# Patient Record
Sex: MALE | Race: WHITE
[De-identification: names, ages, dates, MRNs, and addresses within clinical notes are randomized per-mention and may not be internally consistent; named-entity substitution may affect disease eponyms.]

---

## 2019-03-09 ENCOUNTER — HOSPITAL ENCOUNTER (EMERGENCY)
Dept: HOSPITAL 56 - MW.ED | Age: 49
Discharge: HOME | End: 2019-03-09
Payer: COMMERCIAL

## 2019-03-09 DIAGNOSIS — Z23: ICD-10-CM

## 2019-03-09 DIAGNOSIS — S61.210A: Primary | ICD-10-CM
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
## 2019-03-09 PROCEDURE — 90471 IMMUNIZATION ADMIN: CPT

## 2019-03-09 PROCEDURE — 90715 TDAP VACCINE 7 YRS/> IM: CPT

## 2019-03-09 PROCEDURE — 99282 EMERGENCY DEPT VISIT SF MDM: CPT

## 2019-03-09 PROCEDURE — 12001 RPR S/N/AX/GEN/TRNK 2.5CM/<: CPT

## 2019-03-09 NOTE — EDM.PDOC
ED HPI GENERAL MEDICAL PROBLEM





- General


Chief Complaint: Laceration


Stated Complaint: CUT ON RT INDEX FINGER


Time Seen by Provider: 03/09/19 21:07


Source of Information: Reports: Patient


History Limitations: Reports: No Limitations





- History of Present Illness


INITIAL COMMENTS - FREE TEXT/NARRATIVE: 


HISTORY AND PHYSICAL:





History of present illness:


Patient is a 48-year-old male who presents to the emergency room with concerns 

of a laceration to the right index finger. Approximately 2 cm along the lateral 

aspect of the middle knuckle. Appears to have no tendon involvement. He denies 

any crush injury or trauma associated with this. He denies any numbness or 

tingling to the affected extremity.





Review of systems: 


As per history of present illness and below otherwise all systems reviewed and 

negative.





Past medical history: 


As per history of present illness and as reviewed below otherwise 

noncontributory.





Surgical history: 


As per history of present illness and as reviewed below otherwise 

noncontributory.





Social history: 


See social history for further information





Family history: 


As per history of present illness and as reviewed below otherwise 

noncontributory.





Physical exam:


General: Well-developed and well-nourished 48-year-old male. Alert and 

oriented. Nontoxic appearing and in no acute distress.


HEENT: Atraumatic, normocephalic, pupils equal and reactive bilaterally, 

negative for conjunctival pallor or scleral icterus, mucous membranes moist, 

TMs normal bilaterally, throat clear, neck supple, nontender, trachea midline. 

No drooling or trismus noted. No meningeal signs. No hot potato voice noted. 


Lungs: Clear to auscultation, breath sounds equal bilaterally, chest nontender.


Heart: S1S2, regular rate and rhythm without overt murmur


Abdomen: Soft, nondistended, nontender. 


Pelvis: Stable nontender.


Genitourinary: Deferred.


Rectal: Deferred.


Skin: See Extremities. Otherwise skin is intact, warm, dry. No lesions or 

rashes noted.


Extremities: 2 cm laceration along the lateral aspect of the right index finger 

at the MIP joint. Wrist have no tendon involvement. Good strength and capillary 

refill. Neurovascular unremarkable.


Neuro: Awake, alert, oriented. Cranial nerves II through XII unremarkable. 

Cerebellum unremarkable. Motor and sensory unremarkable throughout. Exam 

nonfocal.





Notes:


Appears to have no tendon involvement. 1% lidocaine was used to anesthetize the 

area. Laceration was irrigated and wound wash/chlorhexidine was used. Usual and 

customary procedures for suture placement. 5-0 nylon was used to close the area

, #5 interrupted sutures. Aluminum splint was used with education.


Supportive care measures were reviewed and discussed. Voices understanding and 

is agreeable to plan of care. Denies any further questions or concerns at this 

time.





Diagnostics:


None





Therapeutics:


1% lidocaine, Tdap, bacitracin ointment and wound care





Prescription:


None





Impression: 


Finger laceration





Plan:


1. Take the area clean and dry. Continue to monitor for signs of infection. 

Suture to be removed in 7-10 days.


2. Tylenol and/or ibuprofen as needed for pain management.


3. Follow-up with your primary care provider as needed. Return to the ED as 

needed and as discussed.





Definitive disposition and diagnosis as appropriate pending reevaluation and 

review of above.





  ** right middle finger


Pain Score (Numeric/FACES): 2





- Related Data


 Allergies











Allergy/AdvReac Type Severity Reaction Status Date / Time


 


No Known Allergies Allergy   Verified 03/09/19 21:12











Home Meds: 


 Home Meds





amLODIPine Besylate [Amlodipine Besylate] 1 tab PO DAILY 03/09/19 [History]











ED ROS GENERAL





- Review of Systems


Review Of Systems: ROS reveals no pertinent complaints other than HPI.





ED EXAM, SKIN/RASH


Exam: See Below (See dictation)





ED SKIN PROCEDURES





- Laceration/Wound Repair


  ** right index finger


Lac/Wound length In cm: 2


Appearance: Subcutaneous, Linear


Distal NVT: Neuro & Vascular Intact, No Tendon Injury


Local Anesthesia - Lidocaine (Xylocaine): 1% Plain


Local Anesthetic Volume: 3cc


Skin Prep: Chlorhexidine (Hibiciens)


Saline Irrigation (cc's): 30


Exploration/Debridement/Repair: Wound Explored, No Foreign Material Found


Closed with: Sutures


Suture Size: other (5-0)


# of Sutures: 5


Suture Type: Nylon, Interrupted, Simple


Drain Placement: No


Sterile Dressing Applied: Provider


Tetanus Status Addressed: Yes


Complications: No





Course





- Vital Signs


Last Recorded V/S: 


 Last Vital Signs











Temp  97.5 F   03/09/19 21:13


 


Pulse  74   03/09/19 21:13


 


Resp  18   03/09/19 21:13


 


BP  136/92 H  03/09/19 21:13


 


Pulse Ox  98   03/09/19 21:13














- Orders/Labs/Meds


Orders: 


 Active Orders 24 hr











 Category Date Time Status


 


 Vaccines to be Administered [RC] PER UNIT ROUTINE Care  03/09/19 21:11 Ordered











Meds: 


Medications














Discontinued Medications














Generic Name Dose Route Start Last Admin





  Trade Name Janet  PRN Reason Stop Dose Admin


 


Bacitracin  1 dose  03/09/19 21:11  03/09/19 21:19





  Bacitracin Oint 1 Gm  TOP  03/09/19 21:12  1 dose





  ONETIME ONE   Administration





     





     





     





     


 


Diphtheria/Tetanus/Acell Pertussis  0.5 ml  03/09/19 21:10  03/09/19 21:20





  Adacel  IM  03/09/19 21:11  0.5 ml





  .ONCE ONE   Administration





     





     





     





     


 


Lidocaine HCl  5 ml  03/09/19 21:10  03/09/19 21:19





  Xylocaine-Mpf 1%  INJECT  03/09/19 21:11  5 ml





  ONETIME ONE   Administration





     





     





     





     














Departure





- Departure


Time of Disposition: 21:40


Disposition: Home, Self-Care 01


Clinical Impression: 


Finger laceration


Qualifiers:


 Encounter type: initial encounter Finger: index finger Damage to nail status: 

without damage Foreign body presence: without foreign body Laterality: right 

Qualified Code(s): S61.210A - Laceration without foreign body of right index 

finger without damage to nail, initial encounter








- Discharge Information


Instructions:  Laceration Care, Adult, Easy-to-Read


Referrals: 


PCP,None [Primary Care Provider] - 


Forms:  ED Department Discharge


Additional Instructions: 


The following information is given to patients seen in the emergency department 

who are being discharged to home. This information is to outline your options 

for follow-up care. We provide all patients seen in our emergency department 

with a follow-up referral.





The need for follow-up, as well as the timing and circumstances, are variable 

depending upon the specifics of your emergency department visit.





If you don't have a primary care physician on staff, we will provide you with a 

referral. We always advise you to contact your personal physician following an 

emergency department visit to inform them of the circumstance of the visit and 

for follow-up with them and/or the need for any referrals to a consulting 

specialist.





The emergency department will also refer you to a specialist when appropriate. 

This referral assures that you have the opportunity for follow-up care with a 

specialist. All of these measure are taken in an effort to provide you with 

optimal care, which includes your follow-up.





Under all circumstances we always encourage you to contact your private 

physician who remains a resource for coordinating your care. When calling for 

follow-up care, please make the office aware that this follow-up is from your 

recent emergency room visit. If for any reason you are refused follow-up, 

please contact the [facility] Emergency 

Department at [phone] and asked to speak to the emergency department 

charge nurse.





[facility]


Primary Care


[address] [place] [id number]


Phone: [phone]


Fax: [phone]





[facility]


[address] [place] [address]


Phone: [phone]


Fax: [phone]





1. Keep the area clean and dry. He may wash her hands gently twice daily with 

mild soap and water. Do not submerge the hand/finger in water for long periods 

of time. Continue to monitor for signs of infection. Suture to be removed in 7-

10 days.


2. Tylenol and/or ibuprofen as needed for pain management.


3. Follow-up with your primary care provider as needed. Return to the ED as 

needed and as discussed.





- My Orders


Last 24 Hours: 


My Active Orders





03/09/19 21:11


Vaccines to be Administered [RC] PER UNIT ROUTINE 














- Assessment/Plan


Last 24 Hours: 


My Active Orders





03/09/19 21:11


Vaccines to be Administered [RC] PER UNIT ROUTINE

## 2021-10-15 ENCOUNTER — HOSPITAL ENCOUNTER (EMERGENCY)
Dept: HOSPITAL 41 - JD.ED | Age: 51
Discharge: HOME | End: 2021-10-15
Payer: COMMERCIAL

## 2021-10-15 DIAGNOSIS — Z79.899: ICD-10-CM

## 2021-10-15 DIAGNOSIS — I10: ICD-10-CM

## 2021-10-15 DIAGNOSIS — U07.1: Primary | ICD-10-CM

## 2021-10-15 DIAGNOSIS — E78.00: ICD-10-CM

## 2021-10-15 NOTE — EDM.PDOC
ED HPI GENERAL MEDICAL PROBLEM





- General


Chief Complaint: Fever


Stated Complaint: COVID +\FEVER


Time Seen by Provider: 10/15/21 15:00





- History of Present Illness


INITIAL COMMENTS - FREE TEXT/NARRATIVE: 





50-year-old male presents the emergency room with a high fever earlier today and

he is known Covid positive.





The patient has been mildly symptomatic for a little over a week now.  And he 

thought he was getting better however he had a temperature approaching 106 

degrees today.  He feels much better now he did take some Tylenol and Motrin.  

He has generalized achiness and a cough he does not have chest pain.  He did 

have partial loss of taste and smell.  He is treated for hypertension 

hyperlipidemia but no history of diabetes heart problems or other medical 

issues.





- Related Data


                                    Allergies











Allergy/AdvReac Type Severity Reaction Status Date / Time


 


No Known Allergies Allergy   Verified 10/15/21 13:57











Home Meds: 


                                    Home Meds





amLODIPine Besylate [Amlodipine Besylate] 10 mg PO DAILY 03/09/19 [History]


Cholecalciferol (Vitamin D3) [Vitamin D] 1,000 mg PO DAILY 10/15/21 [History]


Ubidecarenone [Coq-10] 1 tab PO DAILY 10/15/21 [History]


atorvaSTATin [Lipitor] 40 mg PO DAILY 10/15/21 [History]











Past Medical History


HEENT History: Reports: None


Cardiovascular History: Reports: High Cholesterol, Hypertension


Respiratory History: Reports: None


Gastrointestinal History: Reports: None


Genitourinary History: Reports: None


Musculoskeletal History: Reports: None


Neurological History: Reports: None


Psychiatric History: Reports: None


Endocrine/Metabolic History: Reports: None


Hematologic History: Reports: None


Immunologic History: Reports: None


Oncologic (Cancer) History: Reports: None


Dermatologic History: Reports: None





- Infectious Disease History


Infectious Disease History: Reports: None





- Past Surgical History


HEENT Surgical History: Reports: Tonsillectomy


GI Surgical History: Reports: Appendectomy


Musculoskeletal Surgical History: Reports: Other (See Below)


Other Musculoskeletal Surgeries/Procedures:: hand surgery





Social & Family History





- Family History


Family Medical History: No Pertinent Family History





- Tobacco Use


Tobacco Use Status *Q: Never Tobacco User





- Caffeine Use


Caffeine Use: Reports: Coffee, Soda





- Alcohol Use


Days Per Week of Alcohol Use: 4


Number of Drinks Per Day: 3


Total Drinks Per Week: 12





- Recreational Drug Use


Recreational Drug Use: No





ED ROS GENERAL





- Review of Systems


Review Of Systems: See Below


Constitutional: Reports: No Symptoms, Fever, Malaise, Fatigue.  Denies: Chills


HEENT: Reports: Other (Vague congestive symptoms)


Respiratory: Reports: Cough.  Denies: Shortness of Breath, Wheezing


Cardiovascular: Reports: No Symptoms


Endocrine: Reports: No Symptoms


GI/Abdominal: Reports: No Symptoms


: Reports: No Symptoms


Musculoskeletal: Reports: No Symptoms


Skin: Reports: No Symptoms


Neurological: Reports: No Symptoms





ED EXAM, GENERAL





- Physical Exam


Exam: See Below


Exam Limited By: No Limitations


General Appearance: Alert, No Apparent Distress


Eye Exam: Bilateral Eye: Normal Inspection, PERRL


Ears: Normal External Exam, Normal Canal, Hearing Grossly Normal, Normal TMs


Nose: Normal Inspection, Normal Mucosa, No Blood


Throat/Mouth: Normal Inspection, Normal Lips, Normal Teeth, Normal Gums, Normal 

Oropharynx, Normal Voice, No Airway Compromise


Head: Atraumatic, Normocephalic


Neck: Normal Inspection, Supple, Non-Tender, Full Range of Motion.  No: 

Lymphadenopathy (L), Lymphadenopathy (R)


Respiratory/Chest: No Respiratory Distress, Lungs Clear, Normal Breath Sounds


Cardiovascular: Regular Rate, Rhythm, No Edema, No Murmur


GI/Abdominal: Normal Bowel Sounds, Soft, Non-Tender


Back Exam: Normal Inspection, Full Range of Motion.  No: CVA Tenderness (L), CVA

 Tenderness (R)


Extremities: Normal Inspection, Normal Range of Motion


Neurological: Alert, Oriented


Psychiatric: Normal Affect, Normal Mood


Skin Exam: Warm, Dry, Intact





Course





- Vital Signs


Last Recorded V/S: 


                                Last Vital Signs











Temp  37.0 C   10/15/21 13:51


 


Pulse  81   10/15/21 13:51


 


Resp  16   10/15/21 13:51


 


BP  126/91 H  10/15/21 13:51


 


Pulse Ox  95   10/15/21 13:51














- Orders/Labs/Meds


Labs: 


                                Laboratory Tests











  10/15/21 10/15/21 10/15/21 Range/Units





  16:30 16:30 16:30 


 


WBC  3.33 L    (4.23-9.07)  K/mm3


 


RBC  5.54    (4.63-6.08)  M/mm3


 


Hgb  16.4    (13.7-17.5)  gm/dl


 


Hct  46.7    (40.1-51.0)  %


 


MCV  84.3    (79.0-92.2)  fl


 


MCH  29.6    (25.7-32.2)  pg


 


MCHC  35.1    (32.2-35.5)  g/dl


 


RDW Std Deviation  36.6    (35.1-43.9)  fL


 


Plt Count  124 L    (163-337)  K/mm3


 


MPV  10.3    (9.4-12.3)  fl


 


Neut % (Auto)  52.9    (34.0-67.9)  %


 


Lymph % (Auto)  33.3    (21.8-53.1)  %


 


Mono % (Auto)  12.9 H    (5.3-12.2)  %


 


Eos % (Auto)  0.3 L    (0.8-7.0)  


 


Baso % (Auto)  0.3    (0.1-1.2)  %


 


Neut # (Auto)  1.76 L    (1.78-5.38)  K/mm3


 


Lymph # (Auto)  1.11 L    (1.32-3.57)  K/mm3


 


Mono # (Auto)  0.43    (0.30-0.82)  K/mm3


 


Eos # (Auto)  0.01 L    (0.04-0.54)  K/mm3


 


Baso # (Auto)  0.01    (0.01-0.08)  K/mm3


 


D-Dimer, Quantitative   0.83 H   (0.19-0.50)  mg/L


 


Sodium    138  (136-145)  mEq/L


 


Potassium    3.9  (3.5-5.1)  mEq/L


 


Chloride    102  ()  mEq/L


 


Carbon Dioxide    25  (21-32)  mEq/L


 


Anion Gap    14.9  (5-15)  


 


BUN    10  (7-18)  mg/dL


 


Creatinine    1.0  (0.7-1.3)  mg/dL


 


Est Cr Clr Drug Dosing    91.25  mL/min


 


Estimated GFR (MDRD)    > 60  (>60)  mL/min


 


BUN/Creatinine Ratio    10.0 L  (14-18)  


 


Glucose    94  (70-99)  mg/dL


 


Calcium    8.7  (8.5-10.1)  mg/dL


 


Ferritin     ()  ng/ml


 


Total Bilirubin    0.9  (0.2-1.0)  mg/dL


 


AST    23  (15-37)  U/L


 


ALT    33  (16-63)  U/L


 


Alkaline Phosphatase    69  ()  U/L


 


Lactate Dehydrogenase    328 H  ()  U/L


 


C-Reactive Protein    2.6 H*  (<1.0)  mg/dL


 


Total Protein    7.5  (6.4-8.2)  g/dl


 


Albumin    3.7  (3.4-5.0)  g/dl


 


Globulin    3.8  gm/dL


 


Albumin/Globulin Ratio    1.0  (1-2)  














  10/15/21 Range/Units





  16:30 


 


WBC   (4.23-9.07)  K/mm3


 


RBC   (4.63-6.08)  M/mm3


 


Hgb   (13.7-17.5)  gm/dl


 


Hct   (40.1-51.0)  %


 


MCV   (79.0-92.2)  fl


 


MCH   (25.7-32.2)  pg


 


MCHC   (32.2-35.5)  g/dl


 


RDW Std Deviation   (35.1-43.9)  fL


 


Plt Count   (163-337)  K/mm3


 


MPV   (9.4-12.3)  fl


 


Neut % (Auto)   (34.0-67.9)  %


 


Lymph % (Auto)   (21.8-53.1)  %


 


Mono % (Auto)   (5.3-12.2)  %


 


Eos % (Auto)   (0.8-7.0)  


 


Baso % (Auto)   (0.1-1.2)  %


 


Neut # (Auto)   (1.78-5.38)  K/mm3


 


Lymph # (Auto)   (1.32-3.57)  K/mm3


 


Mono # (Auto)   (0.30-0.82)  K/mm3


 


Eos # (Auto)   (0.04-0.54)  K/mm3


 


Baso # (Auto)   (0.01-0.08)  K/mm3


 


D-Dimer, Quantitative   (0.19-0.50)  mg/L


 


Sodium   (136-145)  mEq/L


 


Potassium   (3.5-5.1)  mEq/L


 


Chloride   ()  mEq/L


 


Carbon Dioxide   (21-32)  mEq/L


 


Anion Gap   (5-15)  


 


BUN   (7-18)  mg/dL


 


Creatinine   (0.7-1.3)  mg/dL


 


Est Cr Clr Drug Dosing   mL/min


 


Estimated GFR (MDRD)   (>60)  mL/min


 


BUN/Creatinine Ratio   (14-18)  


 


Glucose   (70-99)  mg/dL


 


Calcium   (8.5-10.1)  mg/dL


 


Ferritin  730 H  ()  ng/ml


 


Total Bilirubin   (0.2-1.0)  mg/dL


 


AST   (15-37)  U/L


 


ALT   (16-63)  U/L


 


Alkaline Phosphatase   ()  U/L


 


Lactate Dehydrogenase   ()  U/L


 


C-Reactive Protein   (<1.0)  mg/dL


 


Total Protein   (6.4-8.2)  g/dl


 


Albumin   (3.4-5.0)  g/dl


 


Globulin   gm/dL


 


Albumin/Globulin Ratio   (1-2)  














- Re-Assessments/Exams


Free Text/Narrative Re-Assessment/Exam: 





10/15/21 18:13


Chest x-ray looks essentially normal radiology suggested that there might be 

some minimal Covid pneumonia like findings in the left upper lobe.  Labs 

consistent with a Covid infection minimal elevation of his C-reactive protein 

and D-dimer.





Discussed pros and cons of monoclonal antibody therapy and the patient really 

would like to hold off on this at this point as he believes he is getting better

 he was just concerned about the fever that he had earlier today.





Departure





- Departure


Time of Disposition: 18:14


Disposition: Home, Self-Care 01


Clinical Impression: 


 COVID-19








- Discharge Information


Referrals: 


PCP,Not In Area [Primary Care Provider] - 


Forms:  ED Department Discharge


Additional Instructions: 


Return to the emergency room with any questions problems or worsening symptoms.





Tylenol as needed for aches and pains and fever ibuprofen if only absolutely 

necessary.





Continue your routine medications.  





Continue social isolation for 10 days after the onset of symptoms or 4 days of 

being absolutely symptom-free without the aid of any medication.





Sepsis Event Note (ED)





- Evaluation


Sepsis Screening Result: No Definite Risk





- Focused Exam


Vital Signs: 


                                   Vital Signs











  Temp Pulse Resp BP Pulse Ox


 


 10/15/21 13:51  37.0 C  81  16  126/91 H  95

## 2021-10-15 NOTE — CR
Chest: Portable view of the chest was obtained.

 

Comparison: No prior chest imaging is available.

 

Heart size and mediastinum are normal.  Questionable density within 

the left upper chest is seen.  Difficult to exclude small area of 

vague COVID pneumonia.  Lungs otherwise are clear.  Bony structures 

show nothing acute.

 

Impression:

1.  Questionable mild left upper lobe COVID pneumonia.

2.  Nothing acute is otherwise seen.

 

Diagnostic code #2